# Patient Record
Sex: MALE | Race: WHITE | Employment: OTHER | URBAN - METROPOLITAN AREA
[De-identification: names, ages, dates, MRNs, and addresses within clinical notes are randomized per-mention and may not be internally consistent; named-entity substitution may affect disease eponyms.]

---

## 2017-09-11 ENCOUNTER — HOSPITAL ENCOUNTER (OUTPATIENT)
Dept: OTHER | Age: 52
Discharge: HOME | End: 2017-09-11
Attending: PAIN MEDICINE | Admitting: PAIN MEDICINE

## 2017-09-11 DIAGNOSIS — M47.812 CERVICAL SPONDYLOSIS WITHOUT MYELOPATHY: ICD-10-CM

## 2017-10-10 ENCOUNTER — HOSPITAL ENCOUNTER (OUTPATIENT)
Dept: OTHER | Age: 52
Discharge: HOME | End: 2017-10-10
Attending: PAIN MEDICINE | Admitting: PAIN MEDICINE

## 2017-10-10 DIAGNOSIS — M47.816 OSTEOARTHRITIS OF LUMBAR SPINE, UNSPECIFIED SPINAL OSTEOARTHRITIS COMPLICATION STATUS: ICD-10-CM

## 2017-10-10 DIAGNOSIS — M47.812 SPONDYLOSIS OF CERVICAL REGION WITHOUT MYELOPATHY OR RADICULOPATHY: ICD-10-CM

## 2019-05-20 ENCOUNTER — HOSPITAL ENCOUNTER (OUTPATIENT)
Age: 54
Discharge: HOME | End: 2019-05-20
Payer: COMMERCIAL

## 2019-05-20 ENCOUNTER — HOSPITAL ENCOUNTER (OUTPATIENT)
Dept: NEUROLOGY | Age: 54
Discharge: HOME | End: 2019-05-20
Payer: COMMERCIAL

## 2019-05-20 ENCOUNTER — HOSPITAL ENCOUNTER (OUTPATIENT)
Dept: GENERAL RADIOLOGY | Age: 54
Discharge: HOME | End: 2019-05-20
Payer: COMMERCIAL

## 2019-05-20 DIAGNOSIS — M54.12 CERVICAL RADICULOPATHY: ICD-10-CM

## 2019-05-20 DIAGNOSIS — M54.16 LUMBAR RADICULOPATHY: ICD-10-CM

## 2019-05-20 PROCEDURE — 95912 NRV CNDJ TEST 11-12 STUDIES: CPT

## 2019-05-20 PROCEDURE — 72100 X-RAY EXAM L-S SPINE 2/3 VWS: CPT

## 2019-05-20 PROCEDURE — 95886 MUSC TEST DONE W/N TEST COMP: CPT

## 2019-05-20 PROCEDURE — 72040 X-RAY EXAM NECK SPINE 2-3 VW: CPT

## 2019-05-20 NOTE — PROCEDURES
Electrodiagnostic Report  [address] [place] [address]  Physical Medicine & Rehabilitation    05/20/19    Chelita Salvage  48 y.o.  1965  [id number]  Referring Provider: DEJA Gore    Clinical Problem:  48 y.o with injury at work years ago continues with neck and back pain. PMH no spine surgery  + bilateral ulnar transposition + carpal tunnel release 5+ yrs ago  + diabetes mainly managed with oral meds.    PE: Reflexes trace, + thumb opposition weakness    EMG SUMMARY TABLE RIGHT/LEFT UPPER     Spontaneous     MUAP   Recruitment    Insertional Activity Fibrillation Potential Positive Sharp Waves   Fasiculation High Frequency Potentials   Amp Duration PPP Pattern   Deltoid C5.6 Ax normal none none none none normal normal normal normal   Biceps C5,6 musc cut normal none none none none normal normal normal normal   Triceps C6,7,8 Radial normal none none none none normal normal normal normal   Pronator Teres C6,7 Median normal none none none none normal normal normal normal   Brachio Rad C5,6 Radial normal none none none none normal normal normal normal   Ext Ind Prop C7,8 Radial normal none none none none normal normal normal normal   Oppones Poll C8-T1 Median normal none none none none normal normal normal normal   1st Dorsal Int C8-T1 Ulnar normal none none none none normal normal normal normal   Cerv Paraspinals C2-T1 PPR       normal none none none none normal normal normal normal     Nerve Conduction Studies     Nerve Sensory Distal Latency (msec) Sensory Distal Latency (msec) Amp uv Amp uv Motor Distal Latency (msec) Motor Distal Latency (msec) Amp kv Amp kv Motor NCV (m/sec) Motor NCV (m/sec)    Right Left Right Left Right Left Right Left Right Left   Median 3.9 (n<3.6) 3.9 (n<3.6) 10  23  5.3 (n<4.3) 4.7 (n<4.3) 1.8  2.5 50 (n>50) 49 (n>50)   Ulnar 3.6 (n<3.7) 3.6  (n<3.7) 21  20 3.3 (n<4.2) 3.8 (n<4.2) 5.9   5.4 7.9   7.3 58 b.e(n>50)   44 a.e(>45) 50 b.e(n>50)  51  a.e(>45)   Radial (n<3.5) (n<3.5)                     Electrodiagnostic Report      EMG SUMMARY TABLE RIGHT/LEFT LOWER       Spontaneous     MUAP   Recruitment    Insertional Activity Fibrillation Potential Positive Sharp Waves   Fasiculation High Frequency Potentials   Amp Duration PPP Pattern   Vastus Medialis L2-4 Femoral normal none none none none normal normal normal normal   Anterior Tibialis L4,5 Deep Peroneal normal none none none none normal normal normal normal   Gluteus Medius L4-S1 Superior Gut normal none none none none normal normal normal normal   Peroneus Longus L5-S1 Sup Peroneal normal none none none none normal normal normal normal   Posterior Tibialis L5-S1 Tibial normal none none none none normal normal normal normal   Medial Gastroc S1,2 Tibial normal none none none none normal normal normal normal   Extensor Hallicis Z1-L0 Peroneal normal none none none none normal normal normal normal   Extensor Dig Brevis L5-S1 Peroneal normal none none none none normal normal normal normal   LS Paraspinals Posterior Rami       normal none none none none normal normal normal normal       Nerve Conduction Studies     Nerve Sensory Distal Latency (msec) Sensory Distal Latency (msec) Amp uv Amp uv Motor Distal Latency (msec) Motor Distal Latency (msec) Amp uv Amp uv Motor NCV (m/sec) Motor NCV (m/sec)    Right Left Right Left Right Left Right Left Right Left   Sural 5.0 (n<4.2) 4.7 (n<4.2) 5 6         Peroneal     5.3 (n<5.5) 4.7 (n<5.5) 3.6  1.0 35 (n>40) 34(n>40)   Above Knee         (n>40) (n>40)   Tibial      (n<6.2) 4.8(n<6.2)  3.9  (n>40) 36 (n>40)   H-Reflex                 Summary of EMG and Nerve Conduction Findings: The above EMG needle exam was within normal limits. Nerve conduction studies demonstrate prolongation of both median sensory and motor distal latencies There is prolongation of sural sensory responses.  There is slowing of motor conduction velocities in lower limbs Right peroneal motor evoked response is of small amplitude     Overall Impression: study is consistent with mild sensorimotor peripheral neuropathy. There remains prolonged median motor distal latencies post carpal tunnel release. There is also slowing of right ulnar motor conduction velocity across elbow post ulnar surgery. No evidence of an acute radiculopathy . Thank you. Electronically signed by:  Blair Matt DO,5/20/2019,9:27 AM

## 2019-10-10 ENCOUNTER — HOSPITAL ENCOUNTER (OUTPATIENT)
Dept: MRI IMAGING | Age: 54
Discharge: HOME | End: 2019-10-10
Payer: COMMERCIAL

## 2019-10-10 DIAGNOSIS — M54.16 PAIN, RADICULAR, LUMBAR: ICD-10-CM

## 2019-10-10 PROCEDURE — 72148 MRI LUMBAR SPINE W/O DYE: CPT

## 2020-09-04 ENCOUNTER — TELEPHONE (OUTPATIENT)
Dept: PULMONOLOGY | Age: 55
End: 2020-09-04

## 2020-09-04 NOTE — TELEPHONE ENCOUNTER
Within this Telehealth Consent, the terms you and yours refer to the person using the Telehealth Service (Service), or in the case of a use of the Service by or on behalf of a minor, you and yours refer to and include (i) the parent or legal guardian who provides consent to the use of the Service by such minor or uses the Service on behalf of such minor, and (ii) the minor for whom consent is being provided or on whose behalf the Service is being utilized. When using Service, you will be consulting with your health care providers via the use of Telehealth.   Telehealth involves the delivery of healthcare services using electronic communications, information technology or other means between a healthcare provider and a patient who are not in the same physical location. Telehealth may be used for diagnosis, treatment, follow-up and/or patient education, and may include, but is not limited to, one or more of the following:    Electronic transmission of medical records, photo images, personal health information or other data between a patient and a healthcare provider    Interactions between a patient and healthcare provider via audio, video and/or data communications    Use of output data from medical devices, sound and video files    Anticipated Benefits   The use of Telehealth by your Provider(s) through the Service may have the following possible benefits:    Making it easier and more efficient for you to access medical care and treatment for the conditions treated by such Provider(s) utilizing the Service    Allowing you to obtain medical care and treatment by Provider(s) at times that are convenient for you    Enabling you to interact with Provider(s) without the necessity of an in-office appointment     Possible Risks   While the use of Telehealth can provide potential benefits for you, there are also potential risks associated with the use of Telehealth.  These risks include, but may not be limited to the following:    Your Provider(s) may not able to provide medical treatment for your particular condition and you may be required to seek alternative healthcare or emergency care services.  The electronic systems or other security protocols or safeguards used in the Service could fail, causing a breach of privacy of your medical or other information.  Given regulatory requirements in certain jurisdictions, your Provider(s) diagnosis and/or treatment options, especially pertaining to certain prescriptions, may be limited. Acceptance   1. You understand that Services will be provided via Telehealth. This process involves the use of HIPAA compliant and secure, real-time audio-visual interfacing with a qualified and appropriately trained provider located at [facility]. 2. You understand that, under no circumstances, will this session be recorded. 3. You understand that the Provider(s) at [facility] and other clinical participants will be party to the information obtained during the Telehealth session in accordance with best medical practices. 4. You understand that the information obtained during the Telehealth session will be used to help determine the most appropriate treatment options. 5. You understand that You have the right to revoke this consent at any point in time. 6. You understand that Telehealth is voluntary, and that continued treatment is not dependent upon consent. 7. You understand that, in the event of non-consent to Telehealth services and/or technical difficulties, you will obtain services as typically provided in the absence of Telehealth technology. 8. You understand that this consent will be kept in Your medical record. 9. No potential benefits from the use of Telehealth or specific results can be guaranteed. Your condition may not be cured or improved and, in some cases, may get worse.    10. There are limitations in the provision of medical care and treatment via Telehealth and the Service and you may not be able to receive diagnosis and/or treatment through the Service for every condition for which you seek diagnosis and/or treatment. 11. There are potential risks to the use of Telehealth, including but not limited to the risks described in this Telehealth Consent. 12. Your Provider(s) have discussed the use of Telehealth and the Service with you, including the benefits and risks of such and you have provided oral consent to your Provider(s) for the use of Telehealth and the Service. 15. You understand that it is your duty to provide your Provider(s) truthful, accurate and complete information, including all relevant information regarding care that you may have received or may be receiving from other healthcare providers outside of the Service. 14. You understand that each of your Provider(s) may determine in his or sole discretion that your condition is not suitable for diagnosis and/or treatment using the Service, and that you may need to seek medical care and treatment a specialist or other healthcare provider, outside of the Service. 15. You understand that you are fully responsible for payment for all services provided by Provider(s) or through use of the Service and that you may not be able to use third-party insurance. 16. You represent that (a) you have read this Telehealth Consent carefully, (b) you understand the risks and benefits of the Service and the use of Telehealth in the medical care and treatment provided to you by Provider(s) using the Service, and (c) you have the legal capacity and authority to provide this consent for yourself and/or the minor for which you are consenting under applicable federal and state laws, including laws relating to the age of [de-identified] and/or parental/guardian consent.    17. You give your informed consent to the use of Telehealth by Provider(s) using the Service under the terms described in the Terms of Service and this Telehealth Consent. The patient was read the following statement and has consented to the visit as of 9/4/20. The patient has been scheduled for their first telehealth visit on 10/19/20 with Deepak Haji.

## 2020-10-19 ENCOUNTER — VIRTUAL VISIT (OUTPATIENT)
Dept: PULMONOLOGY | Age: 55
End: 2020-10-19
Payer: COMMERCIAL

## 2020-10-19 PROCEDURE — 99244 OFF/OP CNSLTJ NEW/EST MOD 40: CPT | Performed by: NURSE PRACTITIONER

## 2020-10-19 PROCEDURE — G8427 DOCREV CUR MEDS BY ELIG CLIN: HCPCS | Performed by: NURSE PRACTITIONER

## 2020-10-19 ASSESSMENT — SLEEP AND FATIGUE QUESTIONNAIRES
HOW LIKELY ARE YOU TO NOD OFF OR FALL ASLEEP IN A CAR, WHILE STOPPED FOR A FEW MINUTES IN TRAFFIC: 0
ESS TOTAL SCORE: 5
HOW LIKELY ARE YOU TO NOD OFF OR FALL ASLEEP WHEN YOU ARE A PASSENGER IN A CAR FOR AN HOUR WITHOUT A BREAK: 2
HOW LIKELY ARE YOU TO NOD OFF OR FALL ASLEEP WHILE LYING DOWN TO REST IN THE AFTERNOON WHEN CIRCUMSTANCES PERMIT: 3
HOW LIKELY ARE YOU TO NOD OFF OR FALL ASLEEP WHILE SITTING INACTIVE IN A PUBLIC PLACE: 0
HOW LIKELY ARE YOU TO NOD OFF OR FALL ASLEEP WHILE SITTING QUIETLY AFTER LUNCH WITHOUT ALCOHOL: 0
HOW LIKELY ARE YOU TO NOD OFF OR FALL ASLEEP WHILE SITTING AND READING: 0
HOW LIKELY ARE YOU TO NOD OFF OR FALL ASLEEP WHILE WATCHING TV: 0
HOW LIKELY ARE YOU TO NOD OFF OR FALL ASLEEP WHILE SITTING AND TALKING TO SOMEONE: 0

## 2020-10-19 NOTE — LETTER
[facility] Pulmonary, Critical Care, and Sleep  [address] [address]  Phone: [phone]  Fax: [phone]    Rivera Lynn MD        October 19, 2020       Patient: Afshin Ceja   MR Number: <[id number]>   YOB: 1965   Date of Visit: 10/19/2020       Dear Dr. Alexandra Ferrer:    Thank you for the request for consultation for Lenny Sanchez to me for the evaluation of TOMAS. Below are the relevant portions of my assessment and plan of care. Assessment:       · History of TOMAS diagnosed 6 to 7 years ago. Unable to locate previous records. No current treatment  · Snoring  · Observed sleep apnea   · Fatigue  · Hypertension  · Chronic paintaking Vicodin and gabapentin  · Depression, anxiety, PTSDfollowed by psychiatry  · COPDfollowed by PCP on 2 L O2 at night and as needed        Plan:      · SPlit night PSG to evaluate for sleep related breathing disorder if cannot locate old records. · Treatment options were discussed with patient if PSG reveals TOMAS, including CPAP therapy, oral appliances and upper airway surgery. Patient is in favor of trying CPAP again  · Sleep hygiene  · Avoid sedatives, alcohol and caffeinated drinks at bed time. · No driving motorized vehicles or operating heavy machinery while fatigue, drowsy or sleepy. · Weight loss is also recommended as a long-term intervention. · Complications of TOMAS if not treated were discussed with patient patient to include systemic hypertension, pulmonary hypertension, cardiovascular morbidities, car accidents and all cause mortality. Discussed pathophysiology of TOMAS with patient today  Patient education regarding sleep tips      If you have questions, please do not hesitate to call me. I look forward to following Dany Juárez along with you.     Sincerely,        Victorino Hernandez, ALEXUS - CNP    CC providers:  MD Jayda Rodríguez [address]: [phone]

## 2020-10-19 NOTE — PROGRESS NOTES
Patient ID: Filiberto Hartman is a 47 y.o. male who is being seen today for   Chief Complaint   Patient presents with    New Patient     Sleep ref by Dr. Latesha Kamara     Referring: Dr. González Clark    HPI:   Filiberto Hartman is a 47 y.o. male for televideo appointment via video and audio doxy. me virtual visit for TOMAS evaluation. States he was diagnosed with TOMAS 6-7 years ago. States used to have CPAP but it got lost.  States he had a hard time with the mask, states he didn't use it much. States he doesn't have old records. States he has O2 that he uses some nights that was ordered by his PCP. Patient reports snoring at night for the past 10 years. Worse in supine position. Has witnessed apnea. Wakes up at night choking and gasping for air. No restorative sleep. +dry mouth upon awakening. Fatigue and tiredness during the day. Bedtime 9 pm and rise time is 6-730 am. It takes few minutes to fall asleep. STates takes trazodone per psych with good benefit. 2 nocturia. Wakes up 2 times at night. It takes few-60 minutes to fall back a sleep. Takes rare nap during the day. +headache in am. No car wrecks or near wrecks because of the sleepiness. No driving. No weight gain. +forgetfulness and decreased concentration. Drinks 2-3 caffinated beverages per day. No significant alcohol. No restless feelings in legs at night, does have neuropathy. No teeth grinding. No nightmares. No sleep walking. No night time panic attacks. +narcotics. - Vicodin for neck and back pain. Denies drug abuse. +history of depression. +history of anxiety. PTSD. Sees psychiatry. No history of atrial fibrillation. +history of DM. +history of HTN. No history of ischemic heart disease. No history of stroke. ESS is 5. No smoking. No known  FH for RLS or narcolepsy.  +FH for TOMAS- brother      Sleep Medicine 10/19/2020   Sitting and reading 0   Watching TV 0   Sitting, inactive in a public place (e.g. a theatre or a meeting) 0   As a passenger in a car for an hour without a break 2   Lying down to rest in the afternoon when circumstances permit 3   Sitting and talking to someone 0   Sitting quietly after a lunch without alcohol 0   In a car, while stopped for a few minutes in traffic 0   Total score 5       Past Medical History:  Past Medical History:   Diagnosis Date    Asthma     Bipolar 2 disorder ([facility]     Depression     [facility]     TYPE 2    Hyperlipidemia     Hypertension     Neuropathy about 1 year       Past Surgical History:        Procedure Laterality Date    ELBOW SURGERY      bilateral    HAND SURGERY      bilateral       Allergies:  is allergic to ibuprofen. Social History:    TOBACCO:   reports that he has never smoked. He has never used smokeless tobacco.  ETOH:   reports no history of alcohol use. Family History:       Problem Relation Age of Onset    Diabetes Mother     Stroke Mother     Blindness Mother     Alzheimer's Disease Mother     Mental Illness Mother     Cancer Maternal Grandmother     Cancer Paternal Grandmother     Heart Disease Paternal Grandfather        Current Medications:    Current Outpatient Medications:     gabapentin (NEURONTIN) 300 MG capsule, Take 1 capsule by mouth 3 times daily. , Disp: 90 capsule, Rfl: 1    citalopram (CELEXA) 40 MG tablet, Take 1 tablet by mouth daily. , Disp: 30 tablet, Rfl: 1    lithium 600 MG capsule, Take 1 capsule by mouth 2 times daily. (Patient taking differently: Take 200 mg by mouth 2 times daily ), Disp: 90 capsule, Rfl: 1    pantoprazole (PROTONIX) 40 MG tablet, Take 1 tablet by mouth every morning (before breakfast). , Disp: 30 tablet, Rfl: 1    insulin glargine (LANTUS SOLOSTAR) 100 UNIT/ML injection, Inject 20 Units into the skin every evening., Disp: , Rfl:     lamoTRIgine (LAMICTAL) 200 MG tablet, Take 200 mg by mouth 2 times daily. , Disp: , Rfl:     ALBUTEROL IN, Inhale 2 puffs into the lungs 4 times daily as needed. , Disp: , Rfl:     LISINOPRIL PO, Take 40 mg by mouth daily. , Disp: , Rfl:     METFORMIN HCL PO, Take 1,000 mg by mouth 2 times daily. , Disp: , Rfl:     Hydrocodone-Acetaminophen (VICODIN PO), Take 1 tablet by mouth 4 times daily as needed. 10/325, Disp: , Rfl:     montelukast (SINGULAIR) 10 MG tablet, Take 10 mg by mouth nightly., Disp: , Rfl:     pravastatin (PRAVACHOL) 10 MG tablet, Take 10 mg by mouth daily. , Disp: , Rfl:     lamoTRIgine (LAMICTAL) 200 MG tablet, Take 1 tablet by mouth 2 times daily. (Patient not taking: Reported on 10/19/2020), Disp: 30 tablet, Rfl: 1    insulin glargine (LANTUS) 100 UNIT/ML injection vial, Inject 40 Units into the skin every morning (before breakfast). , Disp: , Rfl:     baclofen (LIORESAL) 10 MG tablet, Take 10 mg by mouth every 8 hours as needed. , Disp: , Rfl:       Review of Systems  Constitutional: Negative for fever  HENT: Negative for sore throat  Eyes: Negative for redness   Respiratory: Negative for dyspnea, cough  Cardiovascular: Negative for chest pain  Gastrointestinal: Negative for vomiting, diarrhea   Genitourinary: Negative for hematuria   Musculoskeletal: Negative forarthralgias   Skin: Negative for rash  Neurological: Negative for syncope  Hematological: Negative for adenopathy  Psychiatric/Behavorial: Negative for anxiety      Objective:   PHYSICAL EXAM:  There were no vitals taken for this visit. Physical Exam  Exam:  Gen: No acute distress, does not appear to be in pain. Appears well developed and nourished. HENT: Head is normocephalic and atraumatic. Normal appearing nose. External Ears normal.   Neck: No visualized mass. Trachea is midline   Eyes: EOM intact. No visible discharge. Resp:No visualized signs of difficulty breathing or respiratory distress, speaking in full sentences. Respiratory effort normal.  Neuro: Awake. Alert. Able to follow commands. No facial asymmetry. Psych: Oriented x 3. No anxiety. Normal affect.         DATA:       Assessment:       · History of TOMAS diagnosed 6 to 7 years ago. Unable to locate previous records. No current treatment  · Snoring  · Observed sleep apnea   · Fatigue  · Hypertension  · Chronic pain-taking Vicodin and gabapentin  · Depression, anxiety, PTSD-followed by psychiatry  · COPD-followed by PCP on 2 L O2 at night and as needed        Plan:      · SPlit night PSG to evaluate for sleep related breathing disorder if cannot locate old records. · Treatment options were discussed with patient if PSG reveals TOMAS, including CPAP therapy, oral appliances and upper airway surgery. Patient is in favor of trying CPAP again  · Sleep hygiene  · Avoid sedatives, alcohol and caffeinated drinks at bed time. · No driving motorized vehicles or operating heavy machinery while fatigue, drowsy or sleepy. · Weight loss is also recommended as a long-term intervention. · Complications of TOMAS if not treated were discussed with patient patient to include systemic hypertension, pulmonary hypertension, cardiovascular morbidities, car accidents and all cause mortality. Discussed pathophysiology of TOMAS with patient today  Patient education regarding sleep tips       Consent for telehealth visit was obtained and is noted in chart      THIS VISIT WAS COMPLETED VIRTUALLY USING DOXY. Marica Clinton is a 47 y.o. male being evaluated by a Virtual Visit (video visit) encounter to address concerns as mentioned above. A caregiver was present when appropriate. Due to this being a TeleHealth encounter (During [facility] public health emergency), evaluation of the following organ systems was limited: Vitals/Constitutional/EENT/Resp/CV/GI//MS/Neuro/Skin/Heme-Lymph-Imm.   Pursuant to the emergency declaration under the [facility] waiver authority and the [employer] Act, this Virtual Visit was conducted with patient's (and/or legal guardian's) consent, to reduce the

## 2020-10-19 NOTE — PATIENT INSTRUCTIONS
enough blankets to stay warm is recommended. If your room is too noisy, try a white noise machine. If too bright, try black out shades or an eye mask. Dont take worries to bed. Leave worries about work, school etc. behind you when you go to bed. Some people find it helpful to assign a worry period in the evening or late afternoon to write down your worries and get them out of your system. The Sleep Center at [address] [facility], [address] [place] [address]                      Phone: [phone] Fax: [phone]      Please arrive at the [facility] at the time indicated. On Saturday and Sunday night a sleep tech will come down to let you in the building and escort you upstairs to the sleep center; please call from the parking lot if no one is at the door when you arrive. PLEASE DO NOT ARRIVE TO THE SLEEP CENTER ANY EARLIER THAN 8:30PM AS THERE IS NO STAFF ON DUTY AND THE DOORS WILL BE LOCKED    IMPORTANT: We ask that you please phone the [facility] Patient Pre-Services ([phone]) at least 3-5 days prior to your sleep study to pre-register. Failing to pre-register may ultimately cause your insurance to not pay for this procedure. Please be aware that [facility] is a non-smoking facility. There is no smoking allowed anywhere on [facility] property at any time. Each patient room is a private room with cable television, WiFi and a private bathroom with shower facilities. The test itself consists of electrodes and sensors attached to specific areas of your scalp, face, chest and legs. We will also monitor respiratory effort, nasal and oral airflow and oxygen levels. The test will not hurt; it is completely painless and not invasive in any way. Please bring with you:  ? Appropriate nightclothes (pajamas, sweats, etc.), slippers and robe  ?  All medications you need during your stay, including breathing medications, nebulizers and metered dose inhalers, as well as a complete list of all medications you are currently taking. ? Your own toiletries and hairdryer if you wish to shower before you leave  ? Current Photo I.D. and insurance card  ? We do not allow any pillows or bed linens from home due to health regulations  ? We recommend that you do not bring valuables with you to the Sleep Center as we cannot be responsible for any lost or damaged personal items. Please refrain from or reduce the use of caffeine and/or alcohol prior to your sleep study and avoid napping the day of your study as these will affect the accuracy of your test results. If you are ill the day of your test (cold, upper respiratory infection, flu, etc.) please call to reschedule your test as the test will not be accurate if you are ill. If you should need to cancel or reschedule your appointment, please call the Sleep Center at [phone] as soon as possible. Please also call the Sleep Center directly to let us know if you have any special needs, dietary needs or for any further questions.

## 2021-01-11 ENCOUNTER — TELEPHONE (OUTPATIENT)
Dept: PULMONOLOGY | Age: 56
End: 2021-01-11

## 2021-01-11 NOTE — TELEPHONE ENCOUNTER
Patient did not show for 31-90 day sleep follow-up appointment  with Nomi David on 1/11/21 1/7 called patient to r/s as he has not completed sleep study. no VM set up  1/8 ^^^  1/11 Zach Bacon    Patient was also no show on: n/a    LOV   Assessment: 10/19/20      · History of TOMAS diagnosed 6 to 7 years ago. Unable to locate previous records. No current treatment  · Snoring  · Observed sleep apnea   · Fatigue  · Hypertension  · Chronic pain-taking Vicodin and gabapentin  · Depression, anxiety, PTSD-followed by psychiatry  · COPD-followed by PCP on 2 L O2 at night and as needed         Plan:      · SPlit night PSG to evaluate for sleep related breathing disorder if cannot locate old records. · Treatment options were discussed with patient if PSG reveals TOMAS, including CPAP therapy, oral appliances and upper airway surgery. Patient is in favor of trying CPAP again  · Sleep hygiene  · Avoid sedatives, alcohol and caffeinated drinks at bed time. · No driving motorized vehicles or operating heavy machinery while fatigue, drowsy or sleepy. · Weight loss is also recommended as a long-term intervention. · Complications of TOMAS if not treated were discussed with patient patient to include systemic hypertension, pulmonary hypertension, cardiovascular morbidities, car accidents and all cause mortality. · Discussed pathophysiology of TOMAS with patient today  · Patient education regarding sleep tips        Consent for telehealth visit was obtained and is noted in chart        THIS VISIT WAS COMPLETED VIRTUALLY USING DOXY. LANIE Velásquez is a 47 y.o. male being evaluated by a Virtual Visit (video visit) encounter to address concerns as mentioned above.  A caregiver was present when appropriate.  Due to this being a TeleHealth encounter (During HFYCH-41 public health emergency), evaluation of the following organ systems was limited: Vitals/Constitutional/EENT/Resp/CV/GI//MS/Neuro/Skin/Heme-Lymph-Imm.  Pursuant to the emergency declaration under the [facility], [facility] waiver authority and the [employer] Act, this Virtual Visit was conducted with patient's (and/or legal guardian's) consent, to reduce the patient's risk of exposure to COVID-19 and provide necessary medical care.  The patient (and/or legal guardian) has also been advised to contact this office for worsening conditions or problems, and seek emergency medical treatment and/or call 911 if deemed necessary.     Patient identification was verified at the start of the visit: Yes     Total time spent for this encounter: Not billed by time     Services were provided through a video synchronous discussion virtually to substitute for in-person clinic visit.  Patient and provider were located at their individual homes.     --ALEXUS Bonilla - CNP on 10/19/2020 at 6:06 PM     An electronic signature was used to authenticate this note.

## 2021-01-13 NOTE — TELEPHONE ENCOUNTER
Called patient, VM not set up. Unable to leave message. Office has attempted to reach patient multiple times with out success. Please advise.

## 2021-01-13 NOTE — TELEPHONE ENCOUNTER
Patient did not complete recommended testing or keep follow up appointment as was recommended. Please schedule a follow up visit for this patient if he calls requesting such appointment.      Please send letter if unable to reach

## 2021-03-01 ENCOUNTER — HOSPITAL ENCOUNTER (EMERGENCY)
Age: 56
Discharge: HOME | End: 2021-03-01
Attending: STUDENT IN AN ORGANIZED HEALTH CARE EDUCATION/TRAINING PROGRAM
Payer: COMMERCIAL

## 2021-03-01 VITALS
HEIGHT: 67 IN | RESPIRATION RATE: 18 BRPM | BODY MASS INDEX: 32.96 KG/M2 | HEART RATE: 78 BPM | SYSTOLIC BLOOD PRESSURE: 130 MMHG | DIASTOLIC BLOOD PRESSURE: 74 MMHG | OXYGEN SATURATION: 98 % | WEIGHT: 210 LBS | TEMPERATURE: 98.6 F

## 2021-03-01 DIAGNOSIS — K04.7 DENTAL INFECTION: Primary | ICD-10-CM

## 2021-03-01 PROCEDURE — 99285 EMERGENCY DEPT VISIT HI MDM: CPT

## 2021-03-01 PROCEDURE — 6370000000 HC RX 637 (ALT 250 FOR IP): Performed by: STUDENT IN AN ORGANIZED HEALTH CARE EDUCATION/TRAINING PROGRAM

## 2021-03-01 RX ORDER — PENICILLIN V POTASSIUM 500 MG/1
500 TABLET ORAL 4 TIMES DAILY
Qty: 28 TABLET | Refills: 0 | Status: SHIPPED | OUTPATIENT
Start: 2021-03-01 | End: 2021-03-08

## 2021-03-01 RX ORDER — PENICILLIN V POTASSIUM 250 MG/1
500 TABLET ORAL ONCE
Status: COMPLETED | OUTPATIENT
Start: 2021-03-01 | End: 2021-03-01

## 2021-03-01 RX ADMIN — PENICILLIN V POTASIUM 500 MG: 250 TABLET ORAL at 12:57

## 2021-03-01 ASSESSMENT — PAIN DESCRIPTION - LOCATION: LOCATION: TOE (COMMENT WHICH ONE)

## 2021-03-01 ASSESSMENT — PAIN DESCRIPTION - DESCRIPTORS: DESCRIPTORS: CONSTANT;THROBBING

## 2021-03-01 NOTE — ED PROVIDER NOTES
[facility]  Dental Pain (C/o R upper tooth pain)     HISTORY OF PRESENT ILLNESS  Bradley Loyd is a 54 y.o. male  who presents to the ED complaining of right upper tooth pain for the past several weeks. Patient states that he has multiple broken teeth, and has had worsening pain of his right upper tooth over the past several days. He denies associated fevers or chills or drainage. He has no difficulty opening his mouth or swallowing. He states that he is trying to get into see a dentist.  He is already on Vicodin for chronic pain and has been taking at home. He denies any other complaints or concerns. No other complaints, modifying factors or associated symptoms. I have reviewed the following from the nursing documentation.     Past Medical History:   Diagnosis Date    Asthma     B[facility] 2 disorder ([facility]     Depression     Diabetes mellitus ([facility]     TYPE 2    Hyperlipidemia     Hypertension     Neuropathy about 1 year     Past Surgical History:   Procedure Laterality Date    ELBOW SURGERY      bilateral    HAND SURGERY      bilateral     Family History   Problem Relation Age of Onset    Diabetes Mother     Stroke Mother     Blindness Mother     Alzheimer's Disease Mother     Mental Illness Mother     Cancer Maternal Grandmother     Cancer Paternal Grandmother     Heart Disease Paternal Grandfather      Social History     Socioeconomic History    Marital status:      Spouse name: Not on file    Number of children: 3    Years of education: 8    Highest education level: Not on file   Occupational History    Not on file   Social Needs    Financial resource strain: Not on file    Food insecurity     Worry: Not on file     Inability: Not on file   [employer] needs     Medical: Not on file     Non-medical: Not on file   Tobacco Use    Smoking status: Never Smoker    Smokeless tobacco: Never Used   Substance and Sexual Activity    Alcohol use: No     Comment: occasionally, Last use Maiden.  Drug use: No    Sexual activity: Yes     Partners: Female   Lifestyle    Physical activity     Days per week: Not on file     Minutes per session: Not on file    Stress: Not on file   Relationships    Social connections     Talks on phone: Not on file     Gets together: Not on file     Attends Rastafarian service: Not on file     Active member of club or organization: Not on file     Attends meetings of clubs or organizations: Not on file     Relationship status: Not on file    Intimate partner violence     Fear of current or ex partner: Not on file     Emotionally abused: Not on file     Physically abused: Not on file     Forced sexual activity: Not on file   Other Topics Concern    Not on file   Social History Narrative    Not on file     No current facility-administered medications for this encounter. Current Outpatient Medications   Medication Sig Dispense Refill    penicillin v potassium (VEETID) 500 MG tablet Take 1 tablet by mouth 4 times daily for 7 days 28 tablet 0    gabapentin (NEURONTIN) 300 MG capsule Take 1 capsule by mouth 3 times daily. 90 capsule 1    citalopram (CELEXA) 40 MG tablet Take 1 tablet by mouth daily. 30 tablet 1    lithium 600 MG capsule Take 1 capsule by mouth 2 times daily. (Patient taking differently: Take 200 mg by mouth 2 times daily ) 90 capsule 1    pantoprazole (PROTONIX) 40 MG tablet Take 1 tablet by mouth every morning (before breakfast). 30 tablet 1    insulin glargine (LANTUS) 100 UNIT/ML injection vial Inject 40 Units into the skin every morning (before breakfast).  insulin glargine (LANTUS SOLOSTAR) 100 UNIT/ML injection Inject 20 Units into the skin every evening.  baclofen (LIORESAL) 10 MG tablet Take 10 mg by mouth every 8 hours as needed.  lamoTRIgine (LAMICTAL) 200 MG tablet Take 200 mg by mouth 2 times daily.       ALBUTEROL IN Inhale 2 puffs into the lungs 4 times daily as needed.  LISINOPRIL PO Take 40 mg by mouth daily.  METFORMIN HCL PO Take 1,000 mg by mouth 2 times daily.  Hydrocodone-Acetaminophen (VICODIN PO) Take 1 tablet by mouth 4 times daily as needed. 10/325      montelukast (SINGULAIR) 10 MG tablet Take 10 mg by mouth nightly.  pravastatin (PRAVACHOL) 10 MG tablet Take 10 mg by mouth daily. Allergies   Allergen Reactions    Ibuprofen        REVIEW OF SYSTEMS  10 systems reviewed, pertinent positives per HPI otherwise noted to be negative. PHYSICAL EXAM  /74   Pulse 78   Temp 98.6 °F (37 °C) (Oral)   Resp 18 Comment: 99% RA  Ht 5' 7\" (1.702 m)   Wt 210 lb (95.3 kg)   SpO2 98%   BMI 32.89 kg/m²    GENERAL APPEARANCE: Awake and alert. Cooperative. No acute distress. HENT: Normocephalic. Atraumatic. Mucous membranes are moist.  Poor dentition throughout. He has multiple broken teeth and dental caries. Tooth #2 is tender to palpation with no evidence of periapical abscess, minimal gingival erythema. No difficulty swallowing. No tender cervical lymphadenopathy. NECK: Supple. EYES: PERRL. EOM's grossly intact. HEART/CHEST: RRR. No murmurs. LUNGS: Respirations unlabored. CTAB. Good air exchange. Speaking comfortably in full sentences. ABDOMEN: No tenderness. Soft. Non-distended. No masses. No organomegaly. No guarding or rebound. MUSCULOSKELETAL: No extremity edema. Compartments soft. No deformity. No tenderness in the extremities. All extremities neurovascularly intact. SKIN: Warm and dry. No acute rashes. NEUROLOGICAL: Alert and oriented. CN's 2-12 intact. No gross facial drooping. Strength 5/5, sensation intact. PSYCHIATRIC: Normal mood and affect. LABS  I have reviewed all labs for this visit. No results found for this visit on 03/01/21. RADIOLOGY  No orders to display     ED COURSE/MDM  Patient seen and evaluated. Old records reviewed. Labs and imaging reviewed and results discussed with patient. created using [employer] Efforts were made by me to ensure accuracy, however some errors may be present due to limitations of this technology and occasionally words are not transcribed correctly.        Miguelina Bains MD  03/01/21 0729

## 2023-01-24 ENCOUNTER — HOSPITAL ENCOUNTER (EMERGENCY)
Age: 58
Discharge: HOME | End: 2023-01-24
Payer: COMMERCIAL

## 2023-01-24 VITALS
OXYGEN SATURATION: 97 % | WEIGHT: 206 LBS | HEIGHT: 68 IN | HEART RATE: 86 BPM | RESPIRATION RATE: 16 BRPM | TEMPERATURE: 98.8 F | SYSTOLIC BLOOD PRESSURE: 142 MMHG | BODY MASS INDEX: 31.22 KG/M2 | DIASTOLIC BLOOD PRESSURE: 86 MMHG

## 2023-01-24 DIAGNOSIS — M79.89 PAIN AND SWELLING OF LOWER EXTREMITY, LEFT: Primary | ICD-10-CM

## 2023-01-24 DIAGNOSIS — M79.605 PAIN AND SWELLING OF LOWER EXTREMITY, LEFT: Primary | ICD-10-CM

## 2023-01-24 LAB
A/G RATIO: 1.8 (ref 1.1–2.2)
ALBUMIN SERPL-MCNC: 4.6 G/DL (ref 3.4–5)
ALP BLD-CCNC: 118 U/L (ref 40–129)
ALT SERPL-CCNC: 23 U/L (ref 10–40)
ANION GAP SERPL CALCULATED.3IONS-SCNC: 12 MMOL/L (ref 3–16)
APTT: 27 SEC (ref 23–34.3)
AST SERPL-CCNC: 15 U/L (ref 15–37)
BASOPHILS ABSOLUTE: 0.1 K/UL (ref 0–0.2)
BASOPHILS RELATIVE PERCENT: 0.6 %
BILIRUB SERPL-MCNC: <0.2 MG/DL (ref 0–1)
BUN BLDV-MCNC: 20 MG/DL (ref 7–20)
CALCIUM SERPL-MCNC: 10.6 MG/DL (ref 8.3–10.6)
CHLORIDE BLD-SCNC: 104 MMOL/L (ref 99–110)
CO2: 24 MMOL/L (ref 21–32)
CREAT SERPL-MCNC: 0.9 MG/DL (ref 0.9–1.3)
D DIMER: 0.42 UG/ML FEU (ref 0–0.6)
EOSINOPHILS ABSOLUTE: 0.4 K/UL (ref 0–0.6)
EOSINOPHILS RELATIVE PERCENT: 3.4 %
GFR SERPL CREATININE-BSD FRML MDRD: >60 ML/MIN/{1.73_M2}
GLUCOSE BLD-MCNC: 137 MG/DL (ref 70–99)
HCT VFR BLD CALC: 41.9 % (ref 40.5–52.5)
HEMOGLOBIN: 14.1 G/DL (ref 13.5–17.5)
INR BLD: 0.95 (ref 0.87–1.14)
LYMPHOCYTES ABSOLUTE: 2.7 K/UL (ref 1–5.1)
LYMPHOCYTES RELATIVE PERCENT: 24.6 %
MCH RBC QN AUTO: 28.2 PG (ref 26–34)
MCHC RBC AUTO-ENTMCNC: 33.7 G/DL (ref 31–36)
MCV RBC AUTO: 83.8 FL (ref 80–100)
MONOCYTES ABSOLUTE: 0.5 K/UL (ref 0–1.3)
MONOCYTES RELATIVE PERCENT: 4.9 %
NEUTROPHILS ABSOLUTE: 7.3 K/UL (ref 1.7–7.7)
NEUTROPHILS RELATIVE PERCENT: 66.5 %
PDW BLD-RTO: 14.9 % (ref 12.4–15.4)
PLATELET # BLD: 241 K/UL (ref 135–450)
PMV BLD AUTO: 7 FL (ref 5–10.5)
POTASSIUM REFLEX MAGNESIUM: 4.4 MMOL/L (ref 3.5–5.1)
PROTHROMBIN TIME: 12.5 SEC (ref 11.7–14.5)
RBC # BLD: 5.01 M/UL (ref 4.2–5.9)
SODIUM BLD-SCNC: 140 MMOL/L (ref 136–145)
TOTAL PROTEIN: 7.2 G/DL (ref 6.4–8.2)
WBC # BLD: 11 K/UL (ref 4–11)

## 2023-01-24 PROCEDURE — 80053 COMPREHEN METABOLIC PANEL: CPT

## 2023-01-24 PROCEDURE — 85025 COMPLETE CBC W/AUTO DIFF WBC: CPT

## 2023-01-24 PROCEDURE — 85730 THROMBOPLASTIN TIME PARTIAL: CPT

## 2023-01-24 PROCEDURE — 85379 FIBRIN DEGRADATION QUANT: CPT

## 2023-01-24 PROCEDURE — 85610 PROTHROMBIN TIME: CPT

## 2023-01-24 PROCEDURE — 36415 COLL VENOUS BLD VENIPUNCTURE: CPT

## 2023-01-24 PROCEDURE — 99283 EMERGENCY DEPT VISIT LOW MDM: CPT

## 2023-01-24 RX ORDER — CETIRIZINE HYDROCHLORIDE 10 MG/1
TABLET ORAL
COMMUNITY
Start: 2023-01-04

## 2023-01-24 RX ORDER — METHOCARBAMOL 750 MG/1
TABLET ORAL
COMMUNITY
Start: 2023-01-04

## 2023-01-24 RX ORDER — ASPIRIN 81 MG/1
TABLET, COATED ORAL
COMMUNITY
Start: 2023-01-04

## 2023-01-24 RX ORDER — TRAZODONE HYDROCHLORIDE 50 MG/1
TABLET ORAL
COMMUNITY
Start: 2023-01-04

## 2023-01-24 RX ORDER — LISINOPRIL 5 MG/1
TABLET ORAL
COMMUNITY
Start: 2023-01-04

## 2023-01-24 RX ORDER — INSULIN GLARGINE-YFGN 100 [IU]/ML
INJECTION, SOLUTION SUBCUTANEOUS
COMMUNITY
Start: 2023-01-04

## 2023-01-24 RX ORDER — FLUTICASONE PROPIONATE 50 MCG
SPRAY, SUSPENSION (ML) NASAL
COMMUNITY
Start: 2023-01-04

## 2023-01-24 RX ORDER — GABAPENTIN 800 MG/1
TABLET ORAL
COMMUNITY
Start: 2023-01-04

## 2023-01-24 RX ORDER — GLYBURIDE 5 MG/1
TABLET ORAL
COMMUNITY
Start: 2023-01-04

## 2023-01-24 ASSESSMENT — PAIN DESCRIPTION - ORIENTATION: ORIENTATION: LEFT;LOWER

## 2023-01-24 ASSESSMENT — PAIN DESCRIPTION - PAIN TYPE: TYPE: ACUTE PAIN

## 2023-01-24 ASSESSMENT — ENCOUNTER SYMPTOMS
SHORTNESS OF BREATH: 0
COLOR CHANGE: 0

## 2023-01-24 ASSESSMENT — PAIN DESCRIPTION - LOCATION: LOCATION: LEG

## 2023-01-24 ASSESSMENT — PAIN - FUNCTIONAL ASSESSMENT: PAIN_FUNCTIONAL_ASSESSMENT: 0-10

## 2023-01-24 ASSESSMENT — PAIN DESCRIPTION - DESCRIPTORS: DESCRIPTORS: SHARP

## 2023-01-24 ASSESSMENT — PAIN SCALES - GENERAL: PAINLEVEL_OUTOF10: 6

## 2023-01-24 ASSESSMENT — PAIN DESCRIPTION - FREQUENCY: FREQUENCY: INTERMITTENT

## 2023-01-24 NOTE — ED PROVIDER NOTES
[facility]        Pt Name: Herberth Diaz  MRN: [id number]  [place] 1965  Date of evaluation: 1/24/2023  Provider: Hoa Alas PA-C  PCP: Mode Galdamez MD  Note Started: 1:58 PM EST 1/24/23      DEMETRIUS. I have evaluated this patient. My supervising physician was available for consultation. CHIEF COMPLAINT       Chief Complaint   Patient presents with    Leg Pain     Pt states his left lower leg has had pain and swelling x 1 week, states his pcp came to see him today and referred him to be checked for blood clot. HISTORY OF PRESENT ILLNESS: 1 or more Elements     History From: Patient   Limitations to history : None    Herberth Diaz is a 62 y.o. male who presents to the emergency department for evaluation of left lower leg pain and swelling for the past 1 week. No known injury. Mainly has pain behind his knee and calf. He had his visiting physician come out and see him today was concerned for possible blood clot was that advised to get it checked out. Has never had a blood clot before. No recent travel or surgeries. No chest pain or trouble breathing. Nursing Notes were all reviewed and agreed with or any disagreements were addressed in the HPI. REVIEW OF SYSTEMS :      Review of Systems   Constitutional:  Negative for fever. Respiratory:  Negative for shortness of breath. Cardiovascular:  Positive for leg swelling. Negative for chest pain. Musculoskeletal:         Left lower leg pain   Skin:  Negative for color change, rash and wound. Positives and Pertinent negatives as per HPI. SURGICAL HISTORY     Past Surgical History:   Procedure Laterality Date    ELBOW SURGERY      bilateral    HAND SURGERY      bilateral       CURRENTMEDICATIONS       Previous Medications    ALBUTEROL IN    Inhale 2 puffs into the lungs 4 times daily as needed.     ASPIRIN LOW DOSE 81 MG EC TABLET        BACLOFEN (LIORESAL) 10 MG TABLET    Take 10 mg by mouth every 8 hours as needed. CETIRIZINE (ZYRTEC) 10 MG TABLET        CITALOPRAM (CELEXA) 40 MG TABLET    Take 1 tablet by mouth daily. D3-50 1.25 MG (71609 UT) CAPS    TAKE 1 CAPSULE BY MOUTH EVERY MONDAY    FLUTICASONE (FLONASE) 50 MCG/ACT NASAL SPRAY    INSTILL TWO (2) SPRAYS IN EACH NOSTRIL EVERY DAY IN THE MORNING    GABAPENTIN (NEURONTIN) 800 MG TABLET    TAKE 1 TABLET BY MOUTH EVERY 8 HOURS    GLYBURIDE (DIABETA) 5 MG TABLET        HYDROCODONE-ACETAMINOPHEN (VICODIN PO)    Take 1 tablet by mouth 4 times daily as needed. 10/325    LAMOTRIGINE (LAMICTAL) 200 MG TABLET    Take 200 mg by mouth 2 times daily. LISINOPRIL (PRINIVIL;ZESTRIL) 5 MG TABLET        LITHIUM 600 MG CAPSULE    Take 1 capsule by mouth 2 times daily. METFORMIN (GLUCOPHAGE) 1000 MG TABLET    TAKE 1 TABLET BY MOUTH TWO TIMES A DAY    MONTELUKAST (SINGULAIR) 10 MG TABLET    Take 10 mg by mouth nightly. PANTOPRAZOLE (PROTONIX) 40 MG TABLET    Take 1 tablet by mouth every morning (before breakfast). PRAVASTATIN (PRAVACHOL) 10 MG TABLET    Take 10 mg by mouth daily. SEMGLEE, YFGN, 100 UNIT/ML SOPN    INJECT 50 UNITS SUBCUTANEOUSLY TWICE DAILY    TRAZODONE (DESYREL) 50 MG TABLET    TAKE 1-2 TABLETS BY MOUTH AT BEDTIME AS NEEDED       ALLERGIES     Ibuprofen    FAMILYHISTORY       Family History   Problem Relation Age of Onset    Diabetes Mother     Stroke Mother     Blindness Mother     Alzheimer's Disease Mother     Mental Illness Mother     Cancer Maternal Grandmother     Cancer Paternal Grandmother     Heart Disease Paternal Grandfather         SOCIAL HISTORY       Social History     Tobacco Use    Smoking status: Never    Smokeless tobacco: Never   Vaping Use    Vaping Use: Never used   Substance Use Topics    Alcohol use: No     Comment: occasionally, Last use Prabhu.     Drug use: No       SCREENINGS        [place] Coma Scale  Eye Opening: Spontaneous  Best Verbal Response: Oriented  Best Motor Response: Obeys commands  [place] Coma Scale Score: 15                [facility] Assessment  BP: (!) 142/86  Heart Rate: 86           PHYSICAL EXAM  1 or more Elements     ED Triage Vitals [01/24/23 1348]   BP Temp Temp Source Heart Rate Resp SpO2 Height Weight   (!) 142/86 98.8 °F (37.1 °C) Oral 86 16 97 % 5' 8\" (1.727 m) 206 lb (93.4 kg)       Physical Exam  Vitals and nursing note reviewed. Constitutional:       Appearance: He is well-developed. He is not ill-appearing or toxic-appearing. HENT:      Head: Normocephalic and atraumatic. Mouth/Throat:      Mouth: Mucous membranes are moist.      Pharynx: Oropharynx is clear. Cardiovascular:      Rate and Rhythm: Normal rate and regular rhythm. Pulses: Normal pulses. Popliteal pulses are 2+ on the right side and 2+ on the left side. Dorsalis pedis pulses are 2+ on the right side and 2+ on the left side. Posterior tibial pulses are 2+ on the right side and 2+ on the left side. Heart sounds: Normal heart sounds. Pulmonary:      Effort: Pulmonary effort is normal. No respiratory distress. Breath sounds: Normal breath sounds. No stridor. No wheezing or rales. Musculoskeletal:      Comments: Tenderness to left posterior calf and behind the knee with mild swelling left lower leg compared to the right. No erythema or any skin changes. Intact sensation. Distal pulses 2+. Skin:     General: Skin is warm and dry. Capillary Refill: Capillary refill takes less than 2 seconds. Neurological:      Mental Status: He is alert and oriented to person, place, and time. GCS: GCS eye subscore is 4. GCS verbal subscore is 5. GCS motor subscore is 6. Sensory: Sensation is intact. Motor: Motor function is intact. No abnormal muscle tone.    Psychiatric:         Behavior: Behavior normal.           DIAGNOSTIC RESULTS   LABS:    Labs Reviewed   COMPREHENSIVE METABOLIC PANEL W/ REFLEX TO MG FOR LOW K - Abnormal; Notable for the following components:       Result Value    Glucose 137 (*)     All other components within normal limits   CBC WITH AUTO DIFFERENTIAL   D-DIMER, QUANTITATIVE   PROTIME-INR   APTT     Results for orders placed or performed during the hospital encounter of 01/24/23   CBC with Auto Differential   Result Value Ref Range    WBC 11.0 4.0 - 11.0 K/uL    RBC 5.01 4.20 - 5.90 M/uL    Hemoglobin 14.1 13.5 - 17.5 g/dL    Hematocrit 41.9 40.5 - 52.5 %    MCV 83.8 80.0 - 100.0 fL    MCH 28.2 26.0 - 34.0 pg    MCHC 33.7 31.0 - 36.0 g/dL    RDW 14.9 12.4 - 15.4 %    Platelets 206 639 - 821 K/uL    MPV 7.0 5.0 - 10.5 fL    Neutrophils % 66.5 %    Lymphocytes % 24.6 %    Monocytes % 4.9 %    Eosinophils % 3.4 %    Basophils % 0.6 %    Neutrophils Absolute 7.3 1.7 - 7.7 K/uL    Lymphocytes Absolute 2.7 1.0 - 5.1 K/uL    Monocytes Absolute 0.5 0.0 - 1.3 K/uL    Eosinophils Absolute 0.4 0.0 - 0.6 K/uL    Basophils Absolute 0.1 0.0 - 0.2 K/uL   Comprehensive Metabolic Panel w/ Reflex to MG   Result Value Ref Range    Sodium 140 136 - 145 mmol/L    Potassium reflex Magnesium 4.4 3.5 - 5.1 mmol/L    Chloride 104 99 - 110 mmol/L    CO2 24 21 - 32 mmol/L    Anion Gap 12 3 - 16    Glucose 137 (H) 70 - 99 mg/dL    BUN 20 7 - 20 mg/dL    Creatinine 0.9 0.9 - 1.3 mg/dL    Est, Glom Filt Rate >60 >60    Calcium 10.6 8.3 - 10.6 mg/dL    Total Protein 7.2 6.4 - 8.2 g/dL    Albumin 4.6 3.4 - 5.0 g/dL    Albumin/Globulin Ratio 1.8 1.1 - 2.2    Total Bilirubin <0.2 0.0 - 1.0 mg/dL    Alkaline Phosphatase 118 40 - 129 U/L    ALT 23 10 - 40 U/L    AST 15 15 - 37 U/L   D-Dimer, Quantitative   Result Value Ref Range    D-Dimer, Quant 0.42 0.00 - 0.60 ug/mL FEU   Protime-INR   Result Value Ref Range    Protime 12.5 11.7 - 14.5 sec    INR 0.95 0.87 - 1.14   APTT   Result Value Ref Range    aPTT 27.0 23.0 - 34.3 sec         When ordered only abnormal lab results are displayed.  All other labs were within normal range or not returned as of this dictation. EKG: When ordered, EKG's are interpreted by the Emergency Department Physician in the absence of a cardiologist.  Please see their note for interpretation of EKG. RADIOLOGY:   Non-plain film images such as CT, Ultrasound and MRI are read by the radiologist. Plain radiographic images are visualized and preliminarily interpreted by the ED Provider with the below findings:        Interpretation per the Radiologist below, if available at the time of this note:    VL Extremity Venous Left    (Results Pending)     No results found. No results found. PROCEDURES   Unless otherwise noted below, none     Procedures    CRITICAL CARE TIME (.cctime)   0    PAST MEDICAL HISTORY      has a past medical history of Asthma, Bipolar 2 disorder ([address] Depression, Diabetes mellitus ([address] Hyperlipidemia, Hypertension, and Neuropathy (about 1 year). EMERGENCY DEPARTMENT COURSE and DIFFERENTIAL DIAGNOSIS/MDM:   Vitals:    Vitals:    01/24/23 1348   BP: (!) 142/86   Pulse: 86   Resp: 16   Temp: 98.8 °F (37.1 °C)   TempSrc: Oral   SpO2: 97%   Weight: 206 lb (93.4 kg)   Height: 5' 8\" (1.727 m)       Patient was given the following medications:  Medications - No data to display          Is this patient to be included in the SEP-1 Core Measure due to severe sepsis or septic shock? No   Exclusion criteria - the patient is NOT to be included for SEP-1 Core Measure due to: Infection is not suspected    Chronic Conditions affecting care:    has a past medical history of Asthma, Bipolar 2 disorder ([address] Depression, Diabetes mellitus ([address] Hyperlipidemia, Hypertension, and Neuropathy (about 1 year).     CONSULTS: (Who and What was discussed)  None      Social Determinants : Patient lacks transportation    Records Reviewed (Source):     CC/HPI Summary, DDx, ED Course, and Reassessment:       Patient presented to the ER for evaluation of left leg pain and swelling concern for possible blood clot was sent in by his visiting physician. On exam he has tenderness behind his knee and the upper posterior calf there is mild swelling of the left leg compared to the right. No erythema or any skin changes no signs of infection. Neurovascularly intact. Unfortunately we do not have a vascular ultrasound here at this freestanding ER facility I attempted to facilitate him going to [facility] ER today for ultrasound however he does not have transportation he was dropped off by his counselor states they left to go  another client and will not be back in time to take him to [facility] today before vascular ultrasound leaves for the day. Elected to check labs CBC, CMP, D-dimer, PT/INR. D-dimer is 0.42 within normal range. Less likely DVT will hold off on anticoagulation due to risk of this medication in setting of normal D-dimer but due to his asymmetric swelling of left leg I will write him for an outpatient order he can get a vascular ultrasound tomorrow. Discussed possibility of a Baker's cyst.  No signs of infection. Advise rest and elevate his leg and close follow-up with his doctor return for any worsening. He understands and agrees. I estimate there is LOW risk for COMPARTMENT SYNDROME, SEPTIC ARTHRITIS OR NEUROVASCULAR COMPROMISE thus I consider the discharge disposition reasonable. I am the Primary Clinician of Record. FINAL IMPRESSION      1. Pain and swelling of lower extremity, left          DISPOSITION/PLAN     DISPOSITION Decision to Discharge    PATIENT REFERRED TO:  Brianna Lewis MD  [address] [place] [address]  [phone]      call for follow up appointment    [facility]. [facility] Emergency Department  [address] [place] [id number] [phone]    If symptoms worsen    DISCHARGE MEDICATIONS:  New Prescriptions    No medications on file       DISCONTINUED MEDICATIONS:  Discontinued Medications    GABAPENTIN (NEURONTIN) 300 MG CAPSULE    Take 1 capsule by mouth 3 times daily.     INSULIN GLARGINE (LANTUS SOLOSTAR) 100 UNIT/ML INJECTION    Inject 20 Units into the skin every evening. INSULIN GLARGINE (LANTUS) 100 UNIT/ML INJECTION VIAL    Inject 40 Units into the skin every morning (before breakfast). LISINOPRIL PO    Take 40 mg by mouth daily. METFORMIN HCL PO    Take 1,000 mg by mouth 2 times daily.               (Please note that portions of this note were completed with a voice recognition program.  Efforts were made to edit the dictations but occasionally words are mis-transcribed.)    Cynthia Su PA-C (electronically signed)           Kobe Patel PA-C  01/24/23 9044

## 2023-01-24 NOTE — ED NOTES
Pt DC home in good condition with outpt order for left lower leg ultrasound. V/u of Dc instructions. Denies questions or concerns.  Teaching done re: s/s to report.      Jazz Sanchez RN  01/24/23 8961

## 2023-02-03 ENCOUNTER — HOSPITAL ENCOUNTER (OUTPATIENT)
Dept: VASCULAR LAB | Age: 58
Discharge: HOME | End: 2023-02-03
Payer: COMMERCIAL

## 2023-02-03 DIAGNOSIS — M79.89 PAIN AND SWELLING OF LOWER EXTREMITY, LEFT: ICD-10-CM

## 2023-02-03 DIAGNOSIS — M79.605 PAIN AND SWELLING OF LOWER EXTREMITY, LEFT: ICD-10-CM

## 2023-02-03 PROCEDURE — 93971 EXTREMITY STUDY: CPT

## 2024-03-08 ENCOUNTER — TELEPHONE (OUTPATIENT)
Dept: CARDIOLOGY CLINIC | Age: 59
End: 2024-03-08

## 2024-03-08 NOTE — TELEPHONE ENCOUNTER
CARDI REFERRAL      03/08 attempted to contact pt [phone] & [phone] unable to reach or LVM, will try again later

## 2024-11-04 ENCOUNTER — APPOINTMENT (OUTPATIENT)
Dept: GENERAL RADIOLOGY | Age: 59
End: 2024-11-04
Payer: COMMERCIAL

## 2024-11-04 ENCOUNTER — APPOINTMENT (OUTPATIENT)
Dept: CT IMAGING | Age: 59
End: 2024-11-04
Payer: COMMERCIAL

## 2024-11-04 ENCOUNTER — HOSPITAL ENCOUNTER (EMERGENCY)
Age: 59
Discharge: LEFT BEFORE DISCHARGE | End: 2024-11-04
Attending: EMERGENCY MEDICINE
Payer: COMMERCIAL

## 2024-11-04 VITALS
HEART RATE: 78 BPM | RESPIRATION RATE: 16 BRPM | WEIGHT: 196 LBS | DIASTOLIC BLOOD PRESSURE: 68 MMHG | TEMPERATURE: 98.6 F | BODY MASS INDEX: 29.7 KG/M2 | SYSTOLIC BLOOD PRESSURE: 112 MMHG | OXYGEN SATURATION: 97 % | HEIGHT: 68 IN

## 2024-11-04 DIAGNOSIS — S09.90XA CLOSED HEAD INJURY, INITIAL ENCOUNTER: ICD-10-CM
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
## 2024-11-04 DIAGNOSIS — R55 SYNCOPE AND COLLAPSE: ICD-10-CM

## 2024-11-04 LAB
GLUCOSE BLD-MCNC: 160 MG/DL (ref 70–99)
PERFORMED ON: ABNORMAL

## 2024-11-04 PROCEDURE — 93005 ELECTROCARDIOGRAM TRACING: CPT | Performed by: EMERGENCY MEDICINE

## 2024-11-04 PROCEDURE — 71045 X-RAY EXAM CHEST 1 VIEW: CPT

## 2024-11-04 PROCEDURE — 99284 EMERGENCY DEPT VISIT MOD MDM: CPT

## 2024-11-04 PROCEDURE — 70450 CT HEAD/BRAIN W/O DYE: CPT

## 2024-11-04 ASSESSMENT — LIFESTYLE VARIABLES
HOW MANY STANDARD DRINKS CONTAINING ALCOHOL DO YOU HAVE ON A TYPICAL DAY: 7 TO 9
HOW OFTEN DO YOU HAVE A DRINK CONTAINING ALCOHOL: MONTHLY OR LESS

## 2024-11-04 ASSESSMENT — PAIN - FUNCTIONAL ASSESSMENT: PAIN_FUNCTIONAL_ASSESSMENT: NONE - DENIES PAIN

## 2024-11-04 NOTE — ED TRIAGE NOTES
To the ED via EMS after a witnessed fall after drinking this evening. Patient is ambulatory from EMS stretcher with a steady gait. Alert/oriented.

## 2024-11-05 LAB
EKG ATRIAL RATE: 83 BPM
EKG DIAGNOSIS: NORMAL
EKG P AXIS: 40 DEGREES
EKG P-R INTERVAL: 180 MS
EKG Q-T INTERVAL: 374 MS
EKG QRS DURATION: 98 MS
EKG QTC CALCULATION (BAZETT): 439 MS
EKG R AXIS: -61 DEGREES
EKG T AXIS: 51 DEGREES
EKG VENTRICULAR RATE: 83 BPM

## 2024-11-05 PROCEDURE — 93010 ELECTROCARDIOGRAM REPORT: CPT | Performed by: INTERNAL MEDICINE

## 2024-11-05 NOTE — ED NOTES
AMA paperwork provided and reviewed. Patient and is daughter verbalized understanding of all including the ability to return at any time. The patient is alert/oriented and ambulatory out of the department at this time with his daughter.

## 2024-11-05 NOTE — ED PROVIDER NOTES
management of the presenting complaint and symptoms initially, direct bedside care, reevaluation, review of records, and consultation.  There was a high probability of clinically significant life-threatening deterioration in the patient's condition, which required my urgent intervention.     This chart was generated in part by using Dragon Dictation system and may contain errors related to that system including errors in grammar, punctuation, and spelling, as well as words and phrases that may be inappropriate. If there are any questions or concerns please feel free to contact the dictating provider for clarification.     Logan Iqbal MD   [employer]        Logan Iqbal MD  11/04/24 2013

## 2024-11-06 ENCOUNTER — TELEPHONE (OUTPATIENT)
Age: 59
End: 2024-11-06

## 2024-11-06 NOTE — TELEPHONE ENCOUNTER
Npt ref by [place], Absence seizures, memory impairment, tremors.    Attempted to contact pt, vm not set up

## 2024-12-31 ENCOUNTER — OFFICE VISIT (OUTPATIENT)
Age: 59
End: 2024-12-31
Payer: COMMERCIAL

## 2024-12-31 ENCOUNTER — TELEPHONE (OUTPATIENT)
Age: 59
End: 2024-12-31

## 2024-12-31 VITALS
SYSTOLIC BLOOD PRESSURE: 110 MMHG | OXYGEN SATURATION: 96 % | DIASTOLIC BLOOD PRESSURE: 54 MMHG | HEIGHT: 68 IN | WEIGHT: 195 LBS | BODY MASS INDEX: 29.55 KG/M2 | HEART RATE: 87 BPM

## 2024-12-31 DIAGNOSIS — R56.9 SEIZURE (HCC): Primary | ICD-10-CM

## 2024-12-31 PROCEDURE — 1036F TOBACCO NON-USER: CPT | Performed by: PSYCHIATRY & NEUROLOGY

## 2024-12-31 PROCEDURE — 3017F COLORECTAL CA SCREEN DOC REV: CPT | Performed by: PSYCHIATRY & NEUROLOGY

## 2024-12-31 PROCEDURE — G8484 FLU IMMUNIZE NO ADMIN: HCPCS | Performed by: PSYCHIATRY & NEUROLOGY

## 2024-12-31 PROCEDURE — G8419 CALC BMI OUT NRM PARAM NOF/U: HCPCS | Performed by: PSYCHIATRY & NEUROLOGY

## 2024-12-31 PROCEDURE — G8427 DOCREV CUR MEDS BY ELIG CLIN: HCPCS | Performed by: PSYCHIATRY & NEUROLOGY

## 2024-12-31 PROCEDURE — 99203 OFFICE O/P NEW LOW 30 MIN: CPT | Performed by: PSYCHIATRY & NEUROLOGY

## 2024-12-31 RX ORDER — ATORVASTATIN CALCIUM 20 MG/1
1 TABLET, FILM COATED ORAL DAILY
COMMUNITY

## 2024-12-31 RX ORDER — CITALOPRAM HYDROBROMIDE 20 MG/1
20 TABLET ORAL DAILY
COMMUNITY
Start: 2024-12-18

## 2024-12-31 RX ORDER — CARIPRAZINE 1.5 MG/1
1.5 CAPSULE, GELATIN COATED ORAL DAILY
COMMUNITY

## 2024-12-31 RX ORDER — LOPERAMIDE HYDROCHLORIDE 2 MG/1
2 CAPSULE ORAL PRN
COMMUNITY

## 2024-12-31 RX ORDER — EMPAGLIFLOZIN 10 MG/1
10 TABLET, FILM COATED ORAL DAILY
COMMUNITY
Start: 2024-12-18

## 2024-12-31 RX ORDER — LITHIUM CARBONATE 300 MG/1
300 TABLET, FILM COATED, EXTENDED RELEASE ORAL 2 TIMES DAILY
COMMUNITY
Start: 2024-12-18

## 2024-12-31 NOTE — PROGRESS NOTES
Neurology outpatient new visit    Patient name: Syed Fry      Chief Complaint:  New onset of seizure.    History of present illness:  This is a 59 years old right-handed male.  The patient is here for evaluation of the new onset of seizure like activity.  The patient was brought to the ER last month after the patient developed sudden onset of staring spell and loss of consciousness.  The patient cannot remember the event.  The patient had CT brain done in the ER.  It was unremarkable.  The patient denies similar event in the past.  The patient also denies focal weakness, numbness during my assessment.  The patient has no history of CVA or seizure disorder.    Past medical history:    Past Medical History:   Diagnosis Date    Asthma     Bipolar 2 disorder (HCC)     Depression     Diabetes mellitus (HCC)     TYPE 2    Hyperlipidemia     Hypertension     Neuropathy about 1 year       Past surgical history:    Past Surgical History:   Procedure Laterality Date    ELBOW SURGERY      bilateral    HAND SURGERY      bilateral        Medication:    Current Outpatient Medications   Medication Sig Dispense Refill    atorvastatin (LIPITOR) 20 MG tablet Take 1 tablet by mouth daily      VRAYLAR 1.5 MG capsule Take 1 capsule by mouth daily      citalopram (CELEXA) 20 MG tablet Take 1 tablet by mouth daily      loperamide (IMODIUM) 2 MG capsule Take 1 capsule by mouth as needed for Diarrhea      lithium (LITHOBID) 300 MG extended release tablet Take 1 tablet by mouth 2 times daily      JARDIANCE 10 MG tablet Take 1 tablet by mouth daily      gabapentin (NEURONTIN) 800 MG tablet TAKE 1 TABLET BY MOUTH EVERY 8 HOURS      STANLEY, KAROGN, 100 UNIT/ML SOPN       ASPIRIN LOW DOSE 81 MG EC tablet       D3-50 1.25 MG (36581 UT) CAPS TAKE 1 CAPSULE BY MOUTH EVERY MONDAY      fluticasone (FLONASE) 50 MCG/ACT nasal spray INSTILL TWO (2) SPRAYS IN EACH NOSTRIL EVERY DAY IN THE MORNING      glyBURIDE (DIABETA) 5 MG tablet       lisinopril

## 2024-12-31 NOTE — PATIENT INSTRUCTIONS
YOU MUST CONFIRM YOUR APPOINTMENT 1 DAY PRIOR OR IT WILL BE CANCELLED!!   Our office will call you 3 times the day prior to your appointment in an attempt to confirm.  Please return our call ASAP or confirm your appt through [facility] no later than 3 pm the day before your appointment.  If we do not hear back from you by 3 pm to confirm, your appointment will be cancelled & someone will be added into that slot from our wait list.         EEG PREP:  1. Patient should take seizure medicine, as prescribed, on the day of the test  2. Patient must NOT have more than 5 hours of sleep prior to the EEG  3. Patient should have CLEAN hair, no hairspray, gel, cream rinse, hair pins, or chemical treatments within 48 hours prior to EEG  4. NO caffeine, pain medications or sedatives on the day of the test (TAKE SEIZURE MEDS!)  5. Arrive 30 minutes prior to appointment time (check in at Registration Desk on 1st floor of hospital main entrance - by the [employer])  6. Procedure will last 60-90 minutes.

## 2025-07-18 ENCOUNTER — HOSPITAL ENCOUNTER (OUTPATIENT)
Dept: LAB | Age: 60
Discharge: HOME | End: 2025-07-18
Payer: COMMERCIAL

## 2025-07-18 LAB
ALBUMIN SERPL-MCNC: 3.9 G/DL (ref 3.4–5)
ALBUMIN/GLOB SERPL: 1.2 {RATIO} (ref 1.1–2.2)
ALP SERPL-CCNC: 153 U/L (ref 40–129)
ALT SERPL-CCNC: 59 U/L (ref 10–40)
ANION GAP SERPL CALCULATED.3IONS-SCNC: 15 MMOL/L (ref 3–16)
AST SERPL-CCNC: 23 U/L (ref 15–37)
BASOPHILS # BLD: 0 K/UL (ref 0–0.2)
BASOPHILS NFR BLD: 0.4 %
BILIRUB SERPL-MCNC: 0.8 MG/DL (ref 0–1)
BUN SERPL-MCNC: 28 MG/DL (ref 7–20)
CALCIUM SERPL-MCNC: 10 MG/DL (ref 8.3–10.6)
CHLORIDE SERPL-SCNC: 105 MMOL/L (ref 99–110)
CO2 SERPL-SCNC: 18 MMOL/L (ref 21–32)
CREAT SERPL-MCNC: 1.2 MG/DL (ref 0.9–1.3)
DEPRECATED RDW RBC AUTO: 14.1 % (ref 12.4–15.4)
EOSINOPHIL # BLD: 0.3 K/UL (ref 0–0.6)
EOSINOPHIL NFR BLD: 3.7 %
GFR SERPLBLD CREATININE-BSD FMLA CKD-EPI: 69 ML/MIN/{1.73_M2}
GLUCOSE P FAST SERPL-MCNC: 257 MG/DL (ref 70–99)
HCT VFR BLD AUTO: 40.1 % (ref 40.5–52.5)
HGB BLD-MCNC: 13.4 G/DL (ref 13.5–17.5)
LITHIUM DOSE: NORMAL MG
LITHIUM SERPL-MCNC: 0.6 MMOL/L (ref 0.6–1.2)
LYMPHOCYTES # BLD: 0.9 K/UL (ref 1–5.1)
LYMPHOCYTES NFR BLD: 10.5 %
MCH RBC QN AUTO: 28.9 PG (ref 26–34)
MCHC RBC AUTO-ENTMCNC: 33.4 G/DL (ref 31–36)
MCV RBC AUTO: 86.6 FL (ref 80–100)
MONOCYTES # BLD: 0.5 K/UL (ref 0–1.3)
MONOCYTES NFR BLD: 6.1 %
NEUTROPHILS # BLD: 6.4 K/UL (ref 1.7–7.7)
NEUTROPHILS NFR BLD: 79.3 %
PLATELET # BLD AUTO: 205 K/UL (ref 135–450)
PMV BLD AUTO: 7.3 FL (ref 5–10.5)
POTASSIUM SERPL-SCNC: 4.1 MMOL/L (ref 3.5–5.1)
PROT SERPL-MCNC: 7.1 G/DL (ref 6.4–8.2)
RBC # BLD AUTO: 4.63 M/UL (ref 4.2–5.9)
SODIUM SERPL-SCNC: 138 MMOL/L (ref 136–145)
WBC # BLD AUTO: 8.1 K/UL (ref 4–11)

## 2025-07-18 PROCEDURE — 36415 COLL VENOUS BLD VENIPUNCTURE: CPT

## 2025-07-18 PROCEDURE — 80053 COMPREHEN METABOLIC PANEL: CPT

## 2025-07-18 PROCEDURE — 84443 ASSAY THYROID STIM HORMONE: CPT

## 2025-07-18 PROCEDURE — 83036 HEMOGLOBIN GLYCOSYLATED A1C: CPT

## 2025-07-18 PROCEDURE — 80061 LIPID PANEL: CPT

## 2025-07-18 PROCEDURE — 85025 COMPLETE CBC W/AUTO DIFF WBC: CPT

## 2025-07-18 PROCEDURE — 80178 ASSAY OF LITHIUM: CPT

## 2025-07-19 LAB
CHOLEST SERPL-MCNC: 127 MG/DL (ref 0–199)
EST. AVERAGE GLUCOSE BLD GHB EST-MCNC: 119.8 MG/DL
HBA1C MFR BLD: 5.8 %
HDLC SERPL-MCNC: 25 MG/DL (ref 40–60)
LDL CHOLESTEROL: 57 MG/DL
TRIGL SERPL-MCNC: 224 MG/DL (ref 0–150)
TSH SERPL DL<=0.005 MIU/L-ACNC: 1.68 UIU/ML (ref 0.27–4.2)
VLDLC SERPL CALC-MCNC: 45 MG/DL